# Patient Record
Sex: FEMALE | Race: WHITE | Employment: FULL TIME | URBAN - METROPOLITAN AREA
[De-identification: names, ages, dates, MRNs, and addresses within clinical notes are randomized per-mention and may not be internally consistent; named-entity substitution may affect disease eponyms.]

---

## 2017-11-28 ENCOUNTER — HOSPITAL ENCOUNTER (INPATIENT)
Facility: CLINIC | Age: 52
LOS: 3 days | Discharge: HOME | DRG: 897 | End: 2017-12-01
Attending: PSYCHIATRY & NEUROLOGY | Admitting: PSYCHIATRY & NEUROLOGY
Payer: COMMERCIAL

## 2017-11-28 DIAGNOSIS — F15.20 METHAMPHETAMINE USE DISORDER, SEVERE (H): ICD-10-CM

## 2017-11-28 DIAGNOSIS — F19.950 SUBSTANCE-INDUCED PSYCHOTIC DISORDER WITH DELUSIONS (H): ICD-10-CM

## 2017-11-28 DIAGNOSIS — I10 BENIGN ESSENTIAL HYPERTENSION: Primary | ICD-10-CM

## 2017-11-28 DIAGNOSIS — E55.9 VITAMIN D DEFICIENCY: ICD-10-CM

## 2017-11-28 PROBLEM — F29 PSYCHOSIS (H): Status: ACTIVE | Noted: 2017-11-28

## 2017-11-28 LAB
ALCOHOL BREATH TEST: 0 (ref 0–0.01)
AMPHETAMINES UR QL SCN: POSITIVE
BARBITURATES UR QL: NEGATIVE
BENZODIAZ UR QL: NEGATIVE
CANNABINOIDS UR QL SCN: NEGATIVE
COCAINE UR QL: NEGATIVE
ETHANOL UR QL SCN: NEGATIVE
OPIATES UR QL SCN: NEGATIVE

## 2017-11-28 PROCEDURE — 82075 ASSAY OF BREATH ETHANOL: CPT | Performed by: PSYCHIATRY & NEUROLOGY

## 2017-11-28 PROCEDURE — 12400007 ZZH R&B MH INTERMEDIATE UMMC

## 2017-11-28 PROCEDURE — 25000132 ZZH RX MED GY IP 250 OP 250 PS 637: Performed by: STUDENT IN AN ORGANIZED HEALTH CARE EDUCATION/TRAINING PROGRAM

## 2017-11-28 PROCEDURE — 80320 DRUG SCREEN QUANTALCOHOLS: CPT | Performed by: PSYCHIATRY & NEUROLOGY

## 2017-11-28 PROCEDURE — 99285 EMERGENCY DEPT VISIT HI MDM: CPT | Mod: Z6 | Performed by: PSYCHIATRY & NEUROLOGY

## 2017-11-28 PROCEDURE — 90791 PSYCH DIAGNOSTIC EVALUATION: CPT

## 2017-11-28 PROCEDURE — 99285 EMERGENCY DEPT VISIT HI MDM: CPT | Mod: 25 | Performed by: PSYCHIATRY & NEUROLOGY

## 2017-11-28 PROCEDURE — 25000132 ZZH RX MED GY IP 250 OP 250 PS 637: Performed by: PSYCHIATRY & NEUROLOGY

## 2017-11-28 PROCEDURE — 80307 DRUG TEST PRSMV CHEM ANLYZR: CPT | Performed by: PSYCHIATRY & NEUROLOGY

## 2017-11-28 RX ORDER — LISINOPRIL 10 MG/1
10 TABLET ORAL DAILY
Status: DISCONTINUED | OUTPATIENT
Start: 2017-11-28 | End: 2017-11-28

## 2017-11-28 RX ORDER — EPINEPHRINE 0.3 MG/.3ML
0.3 INJECTION SUBCUTANEOUS
Status: DISCONTINUED | OUTPATIENT
Start: 2017-11-28 | End: 2017-12-01 | Stop reason: HOSPADM

## 2017-11-28 RX ORDER — HYDROXYZINE HYDROCHLORIDE 25 MG/1
25-50 TABLET, FILM COATED ORAL EVERY 4 HOURS PRN
Status: DISCONTINUED | OUTPATIENT
Start: 2017-11-28 | End: 2017-12-01 | Stop reason: HOSPADM

## 2017-11-28 RX ORDER — MULTIVITAMIN WITH FOLIC ACID 400 MCG
TABLET ORAL
Status: ON HOLD | COMMUNITY
End: 2017-11-28

## 2017-11-28 RX ORDER — ACETAMINOPHEN 325 MG/1
650 TABLET ORAL EVERY 4 HOURS PRN
Status: DISCONTINUED | OUTPATIENT
Start: 2017-11-28 | End: 2017-12-01 | Stop reason: HOSPADM

## 2017-11-28 RX ORDER — OLANZAPINE 10 MG/1
10 TABLET ORAL
Status: DISCONTINUED | OUTPATIENT
Start: 2017-11-28 | End: 2017-12-01 | Stop reason: HOSPADM

## 2017-11-28 RX ORDER — ALUMINA, MAGNESIA, AND SIMETHICONE 2400; 2400; 240 MG/30ML; MG/30ML; MG/30ML
30 SUSPENSION ORAL EVERY 4 HOURS PRN
Status: DISCONTINUED | OUTPATIENT
Start: 2017-11-28 | End: 2017-12-01 | Stop reason: HOSPADM

## 2017-11-28 RX ORDER — MULTIPLE VITAMINS W/ MINERALS TAB 9MG-400MCG
1 TAB ORAL DAILY
COMMUNITY

## 2017-11-28 RX ORDER — LISINOPRIL 10 MG/1
10 TABLET ORAL DAILY
Status: ON HOLD | COMMUNITY
Start: 2017-08-02 | End: 2017-11-30

## 2017-11-28 RX ORDER — EPINEPHRINE 0.3 MG/.3ML
0.3 INJECTION SUBCUTANEOUS PRN
COMMUNITY
Start: 2017-08-02

## 2017-11-28 RX ORDER — OLANZAPINE 10 MG/2ML
10 INJECTION, POWDER, FOR SOLUTION INTRAMUSCULAR
Status: DISCONTINUED | OUTPATIENT
Start: 2017-11-28 | End: 2017-12-01 | Stop reason: HOSPADM

## 2017-11-28 RX ORDER — OLANZAPINE 10 MG/1
10 TABLET, ORALLY DISINTEGRATING ORAL ONCE
Status: COMPLETED | OUTPATIENT
Start: 2017-11-28 | End: 2017-11-28

## 2017-11-28 RX ORDER — NICOTINE 21 MG/24HR
1 PATCH, TRANSDERMAL 24 HOURS TRANSDERMAL DAILY
Status: DISCONTINUED | OUTPATIENT
Start: 2017-11-28 | End: 2017-12-01 | Stop reason: HOSPADM

## 2017-11-28 RX ORDER — NAPROXEN 250 MG/1
500 TABLET ORAL DAILY PRN
COMMUNITY
Start: 2017-09-27

## 2017-11-28 RX ORDER — MULTIPLE VITAMINS W/ MINERALS TAB 9MG-400MCG
1 TAB ORAL DAILY
Status: DISCONTINUED | OUTPATIENT
Start: 2017-11-29 | End: 2017-12-01 | Stop reason: HOSPADM

## 2017-11-28 RX ORDER — ASPIRIN 81 MG/1
81 TABLET ORAL DAILY
Status: ON HOLD | COMMUNITY
Start: 2017-08-02 | End: 2017-11-30

## 2017-11-28 RX ORDER — ASPIRIN 81 MG/1
81 TABLET ORAL DAILY
Status: DISCONTINUED | OUTPATIENT
Start: 2017-11-29 | End: 2017-12-01 | Stop reason: HOSPADM

## 2017-11-28 RX ORDER — NAPROXEN 500 MG/1
500 TABLET ORAL DAILY PRN
Status: DISCONTINUED | OUTPATIENT
Start: 2017-11-28 | End: 2017-12-01 | Stop reason: HOSPADM

## 2017-11-28 RX ADMIN — OLANZAPINE 10 MG: 10 TABLET, ORALLY DISINTEGRATING ORAL at 15:05

## 2017-11-28 RX ADMIN — Medication 1 MG: at 21:48

## 2017-11-28 RX ADMIN — OLANZAPINE 10 MG: 10 TABLET, FILM COATED ORAL at 21:48

## 2017-11-28 ASSESSMENT — ENCOUNTER SYMPTOMS
CARDIOVASCULAR NEGATIVE: 1
CONSTITUTIONAL NEGATIVE: 1
GASTROINTESTINAL NEGATIVE: 1
HALLUCINATIONS: 1
MUSCULOSKELETAL NEGATIVE: 1
HYPERACTIVE: 1
RESPIRATORY NEGATIVE: 1
SLEEP DISTURBANCE: 1
DECREASED CONCENTRATION: 1
HEMATOLOGIC/LYMPHATIC NEGATIVE: 1
NEUROLOGICAL NEGATIVE: 1
ENDOCRINE NEGATIVE: 1
EYES NEGATIVE: 1
NERVOUS/ANXIOUS: 1

## 2017-11-28 ASSESSMENT — ACTIVITIES OF DAILY LIVING (ADL)
ORAL_HYGIENE: PROMPTS
LAUNDRY: WITH SUPERVISION
GROOMING: PROMPTS
DRESS: INDEPENDENT
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
## 2017-11-28 NOTE — IP AVS SNAPSHOT
MRN:[id number]                      After Visit Summary   11/28/2017    Toshia Palmer    MRN: [id number]           Thank you!     Thank you for choosing [facility] for your care. Our goal is always to provide you with excellent care.        Patient Information     Date Of Birth          1965        Designated Caregiver       Most Recent Value    Caregiver    Will someone help with your care after discharge? no      About your hospital stay     You were admitted on:  November 28, 2017 You last received care in the:  [facility]    You were discharged on:  December 1, 2017       Who to Call     For medical emergencies, please call 911.  For non-urgent questions about your medical care, please call your primary care provider or clinic, None          Attending Provider     Provider Specialty    Steve Shannon MD Psychiatry    Wichser, Tessa Thomas MD Psychiatry       Primary Care Provider Fax #    Physician No Ref-Primary [phone]      Further instructions from your care team        Behavioral Discharge Planning and Instructions      Summary:  You were admitted on 11/28/2017  due to Psychotic Symptomology.  You were treated by Dr. Gladys MD & Dr. Pito MD and discharged on 12/1/2017 [facility]    Principal Diagnosis: Substance induced psychotic disorder  Secondary psychiatric diagnoses:   R/O delusional disorder  Methamphetamine use disorder  PTSD  Tobacco use disorder  History of anxiety and depression        Health Care Follow-up Appointments:       Tuesday December 5, 2017 - 5:00pm    Therapist: Zoraida Sellers PsyD., LP.  Address: [address] [address] ph: [phone] Fax: [phone]    Psychiatry provider - to be rescheduled with above clinic     Attend all scheduled appointments with your outpatient providers. Call at least 24 hours in advance if you need to reschedule an appointment to ensure continued access to your  "outpatient providers.   Major Treatments, Procedures and Findings:  You were provided with: a psychiatric assessment, assessed for medical stability, medication evaluation and/or management, group therapy and milieu management    Symptoms to Report: feeling more aggressive, increased confusion, losing more sleep, mood getting worse or thoughts of suicide    Early warning signs can include: increased depression or anxiety sleep disturbances increased thoughts or behaviors of suicide or self-harm  increased unusual thinking, such as paranoia or hearing voices    Safety and Wellness:  Take all medicines as directed.  Make no changes unless your doctor suggests them.      Follow treatment recommendations.  Refrain from alcohol and non-prescribed drugs.     Resources:   Crisis Intervention: 270.270.8169 or 069-486-3571 (TTY: 801.417.4475).  Call anytime for help.  [employer] [place] [employer]ness (www.[place].org): 866.885.9029 or 694-039-6690.  [facility] Crisis (COPE) Response - Adult [phone]    The treatment team has appreciated the opportunity to work with you.     If you have any questions or concerns our unit number is [phone]      Pending Results     Date and Time Order Name Status Description    11/30/2017 0845 EKG 12-lead, complete Preliminary     11/29/2017 1441 Blood metal panel In process             Admission Information     Date & Time Provider Department Dept. Phone    11/28/2017 Tessa Graham MD [facility] [phone]      Your Vitals Were     Blood Pressure Pulse Temperature Respirations Height Weight    102/68 114 97.8  F (36.6  C) 14 1.575 m (5' 2\") 58.1 kg (128 lb)    Pulse Oximetry BMI (Body Mass Index)                98% 23.41 kg/m2          MyChart Information     [facility] lets you send messages to your doctor, view your test results, renew your prescriptions, schedule appointments and more. To sign up, go to [email or web address] . Click on \"Log in\" on the left side " "of the screen, which will take you to the Welcome page. Then click on \"Sign up Now\" on the right side of the page.     You will be asked to enter the access code listed below, as well as some personal information. Please follow the directions to create your username and password.     Your access code is: [email or web address]  Expires: 3/1/2018 12:06 PM     Your access code will  in 90 days. If you need help or a new code, please call your [facility] clinic or [phone].        Care EveryWhere ID     This is your Care EveryWhere ID. This could be used by other organizations to access your [facility] medical records  [id number]        Equal Access to Services     GUS RAMIREZ : Guadalupe Greenfield, shaji jasmine, tyson beckett, joel benitez . So [facility] [phone].    ATENCIÓN: Si habla español, tiene a diaz disposición servicios gratuitos de asistencia lingüística. Llame al [phone].    We comply with applicable federal civil rights laws and [place] laws. We do not discriminate on the basis of race, color, national origin, age, disability, sex, sexual orientation, or gender identity.               Review of your medicines      START taking        Dose / Directions    DRISDOL 44973 UNITS Caps   Used for:  Vitamin D deficiency        Dose:  45598 Units   Start taking on:  2017   Take 50,000 Units by mouth every 7 days   Quantity:  7 capsule   Refills:  0       * risperiDONE 2 MG tablet   Commonly known as:  risperDAL   Used for:  Substance-induced psychotic disorder with delusions (H)        Dose:  2 mg   Take 1 tablet (2 mg) by mouth At Bedtime   Quantity:  30 tablet   Refills:  0       * risperiDONE 1 MG tablet   Commonly known as:  risperDAL   Used for:  Substance-induced psychotic disorder with delusions (H)        Dose:  1 mg   Take 1 tablet (1 mg) by mouth daily In the morning.   Quantity:  30 tablet   Refills:  0       * Notice:  This list has 2 " medication(s) that are the same as other medications prescribed for you. Read the directions carefully, and ask your doctor or other care provider to review them with you.      CONTINUE these medicines which have NOT CHANGED        Dose / Directions    aspirin 81 MG EC tablet   Used for:  Benign essential hypertension        Dose:  81 mg   Take 1 tablet (81 mg) by mouth daily   Quantity:  30 tablet   Refills:  0       EPINEPHrine 0.3 MG/0.3ML injection 2-pack   Commonly known as:  EPIPEN/ADRENACLICK/or ANY BX GENERIC EQUIV        Dose:  0.3 mg   Inject 0.3 mg into the muscle as needed for anaphylaxis (bee sting)   Refills:  0       hypromellose 0.3 % Soln ophthalmic solution   Commonly known as:  GENTEAL        Dose:  1 drop   Place 1 drop into both eyes every 4 hours as needed for dry eyes   Refills:  0       multivitamin, therapeutic with minerals Tabs tablet        Dose:  1 tablet   Take 1 tablet by mouth daily   Refills:  0       naproxen 250 MG tablet   Commonly known as:  NAPROSYN        Dose:  500 mg   Take 500 mg by mouth daily as needed   Refills:  0         STOP taking     lisinopril 10 MG tablet   Commonly known as:  PRINIVIL/ZESTRIL                Where to get your medicines      These medications were sent to [facility] [place], [place] - [address]  [address] [address]     Phone:  [phone]     aspirin 81 MG EC tablet    DRISDOL 56838 UNITS Caps    risperiDONE 1 MG tablet    risperiDONE 2 MG tablet                Protect others around you: Learn how to safely use, store and throw away your medicines at www.disposemymeds.org.             Medication List: This is a list of all your medications and when to take them. Check marks below indicate your daily home schedule. Keep this list as a reference.      Medications           Morning Afternoon Evening Bedtime As Needed    aspirin 81 MG EC tablet   Take 1 tablet (81 mg) by mouth daily   Last time this was  given:  81 mg on 12/1/2017  8:35 AM                                DRISDOL 36201 UNITS Caps   Take 50,000 Units by mouth every 7 days   Start taking on:  12/6/2017   Last time this was given:  50,000 Units on 11/29/2017  3:30 PM                                EPINEPHrine 0.3 MG/0.3ML injection 2-pack   Commonly known as:  EPIPEN/ADRENACLICK/or ANY BX GENERIC EQUIV   Inject 0.3 mg into the muscle as needed for anaphylaxis (bee sting)                                hypromellose 0.3 % Soln ophthalmic solution   Commonly known as:  GENTEAL   Place 1 drop into both eyes every 4 hours as needed for dry eyes                                multivitamin, therapeutic with minerals Tabs tablet   Take 1 tablet by mouth daily   Last time this was given:  1 tablet on 12/1/2017  8:35 AM                                naproxen 250 MG tablet   Commonly known as:  NAPROSYN   Take 500 mg by mouth daily as needed                                * risperiDONE 2 MG tablet   Commonly known as:  risperDAL   Take 1 tablet (2 mg) by mouth At Bedtime   Last time this was given:  1 mg on 12/1/2017  8:35 AM                                * risperiDONE 1 MG tablet   Commonly known as:  risperDAL   Take 1 tablet (1 mg) by mouth daily In the morning.   Last time this was given:  1 mg on 12/1/2017  8:35 AM                                * Notice:  This list has 2 medication(s) that are the same as other medications prescribed for you. Read the directions carefully, and ask your doctor or other care provider to review them with you.

## 2017-11-28 NOTE — IP AVS SNAPSHOT
[place] [place] [address]    [place] [address]    Phone:  [phone]                                       After Visit Summary   11/28/2017    Toshia Palmer    MRN: [id number]           After Visit Summary Signature Page     I have received my discharge instructions, and my questions have been answered. I have discussed any challenges I see with this plan with the nurse or doctor.    ..........................................................................................................................................  Patient/Patient Representative Signature      ..........................................................................................................................................  Patient Representative Print Name and Relationship to Patient    ..................................................               ................................................  Date                                            Time    ..........................................................................................................................................  Reviewed by Signature/Title    ...................................................              ..............................................  Date                                                            Time

## 2017-11-28 NOTE — ED NOTES
Bed: HW02  Expected date:   Expected time:   Means of arrival:   Comments:  [place] [address], 52 female, hallucinations, calm, 20 min

## 2017-11-29 LAB
ALBUMIN SERPL-MCNC: 3.5 G/DL (ref 3.4–5)
ALP SERPL-CCNC: 96 U/L (ref 40–150)
ALT SERPL W P-5'-P-CCNC: 18 U/L (ref 0–50)
ANION GAP SERPL CALCULATED.3IONS-SCNC: 8 MMOL/L (ref 3–14)
AST SERPL W P-5'-P-CCNC: 12 U/L (ref 0–45)
BASOPHILS # BLD AUTO: 0 10E9/L (ref 0–0.2)
BASOPHILS NFR BLD AUTO: 0.2 %
BILIRUB SERPL-MCNC: 0.3 MG/DL (ref 0.2–1.3)
BUN SERPL-MCNC: 20 MG/DL (ref 7–30)
CALCIUM SERPL-MCNC: 9.7 MG/DL (ref 8.5–10.1)
CHLORIDE SERPL-SCNC: 110 MMOL/L (ref 94–109)
CHOLEST SERPL-MCNC: 148 MG/DL
CO2 SERPL-SCNC: 26 MMOL/L (ref 20–32)
CREAT SERPL-MCNC: 0.76 MG/DL (ref 0.52–1.04)
DEPRECATED CALCIDIOL+CALCIFEROL SERPL-MC: 21 UG/L (ref 20–75)
DIFFERENTIAL METHOD BLD: NORMAL
EOSINOPHIL # BLD AUTO: 0.3 10E9/L (ref 0–0.7)
EOSINOPHIL NFR BLD AUTO: 3.8 %
ERYTHROCYTE [DISTWIDTH] IN BLOOD BY AUTOMATED COUNT: 13.5 % (ref 10–15)
ERYTHROCYTE [SEDIMENTATION RATE] IN BLOOD BY WESTERGREN METHOD: 15 MM/H (ref 0–30)
FOLATE SERPL-MCNC: 17.5 NG/ML
GFR SERPL CREATININE-BSD FRML MDRD: 80 ML/MIN/1.7M2
GLUCOSE SERPL-MCNC: 104 MG/DL (ref 70–99)
HCT VFR BLD AUTO: 42.7 % (ref 35–47)
HDLC SERPL-MCNC: 54 MG/DL
HGB BLD-MCNC: 14.4 G/DL (ref 11.7–15.7)
IMM GRANULOCYTES # BLD: 0 10E9/L (ref 0–0.4)
IMM GRANULOCYTES NFR BLD: 0.1 %
LDLC SERPL CALC-MCNC: 49 MG/DL
LYMPHOCYTES # BLD AUTO: 2.4 10E9/L (ref 0.8–5.3)
LYMPHOCYTES NFR BLD AUTO: 28.4 %
MCH RBC QN AUTO: 30.9 PG (ref 26.5–33)
MCHC RBC AUTO-ENTMCNC: 33.7 G/DL (ref 31.5–36.5)
MCV RBC AUTO: 92 FL (ref 78–100)
MONOCYTES # BLD AUTO: 0.7 10E9/L (ref 0–1.3)
MONOCYTES NFR BLD AUTO: 8.2 %
NEUTROPHILS # BLD AUTO: 4.9 10E9/L (ref 1.6–8.3)
NEUTROPHILS NFR BLD AUTO: 59.3 %
NONHDLC SERPL-MCNC: 94 MG/DL
NRBC # BLD AUTO: 0 10*3/UL
NRBC BLD AUTO-RTO: 0 /100
PLATELET # BLD AUTO: 174 10E9/L (ref 150–450)
POTASSIUM SERPL-SCNC: 4 MMOL/L (ref 3.4–5.3)
PROT SERPL-MCNC: 7.6 G/DL (ref 6.8–8.8)
RBC # BLD AUTO: 4.66 10E12/L (ref 3.8–5.2)
RPR SER-TITR: NEGATIVE {TITER}
SODIUM SERPL-SCNC: 144 MMOL/L (ref 133–144)
TRIGL SERPL-MCNC: 227 MG/DL
TSH SERPL DL<=0.005 MIU/L-ACNC: 2.02 MU/L (ref 0.4–4)
VIT B12 SERPL-MCNC: 781 PG/ML (ref 193–986)
WBC # BLD AUTO: 8.3 10E9/L (ref 4–11)

## 2017-11-29 PROCEDURE — 90853 GROUP PSYCHOTHERAPY: CPT

## 2017-11-29 PROCEDURE — 25000132 ZZH RX MED GY IP 250 OP 250 PS 637: Performed by: STUDENT IN AN ORGANIZED HEALTH CARE EDUCATION/TRAINING PROGRAM

## 2017-11-29 PROCEDURE — 12400007 ZZH R&B MH INTERMEDIATE UMMC

## 2017-11-29 PROCEDURE — 87389 HIV-1 AG W/HIV-1&-2 AB AG IA: CPT | Performed by: STUDENT IN AN ORGANIZED HEALTH CARE EDUCATION/TRAINING PROGRAM

## 2017-11-29 PROCEDURE — 85652 RBC SED RATE AUTOMATED: CPT | Performed by: STUDENT IN AN ORGANIZED HEALTH CARE EDUCATION/TRAINING PROGRAM

## 2017-11-29 PROCEDURE — 80061 LIPID PANEL: CPT | Performed by: STUDENT IN AN ORGANIZED HEALTH CARE EDUCATION/TRAINING PROGRAM

## 2017-11-29 PROCEDURE — 83825 ASSAY OF MERCURY: CPT | Performed by: STUDENT IN AN ORGANIZED HEALTH CARE EDUCATION/TRAINING PROGRAM

## 2017-11-29 PROCEDURE — 93005 ELECTROCARDIOGRAM TRACING: CPT

## 2017-11-29 PROCEDURE — 83655 ASSAY OF LEAD: CPT | Performed by: STUDENT IN AN ORGANIZED HEALTH CARE EDUCATION/TRAINING PROGRAM

## 2017-11-29 PROCEDURE — 84443 ASSAY THYROID STIM HORMONE: CPT | Performed by: PSYCHIATRY & NEUROLOGY

## 2017-11-29 PROCEDURE — 82607 VITAMIN B-12: CPT | Performed by: PSYCHIATRY & NEUROLOGY

## 2017-11-29 PROCEDURE — 80053 COMPREHEN METABOLIC PANEL: CPT | Performed by: PSYCHIATRY & NEUROLOGY

## 2017-11-29 PROCEDURE — 86618 LYME DISEASE ANTIBODY: CPT | Performed by: STUDENT IN AN ORGANIZED HEALTH CARE EDUCATION/TRAINING PROGRAM

## 2017-11-29 PROCEDURE — 86593 SYPHILIS TEST NON-TREP QUANT: CPT | Performed by: PSYCHIATRY & NEUROLOGY

## 2017-11-29 PROCEDURE — 86038 ANTINUCLEAR ANTIBODIES: CPT | Performed by: STUDENT IN AN ORGANIZED HEALTH CARE EDUCATION/TRAINING PROGRAM

## 2017-11-29 PROCEDURE — 82390 ASSAY OF CERULOPLASMIN: CPT | Performed by: STUDENT IN AN ORGANIZED HEALTH CARE EDUCATION/TRAINING PROGRAM

## 2017-11-29 PROCEDURE — 86255 FLUORESCENT ANTIBODY SCREEN: CPT | Performed by: STUDENT IN AN ORGANIZED HEALTH CARE EDUCATION/TRAINING PROGRAM

## 2017-11-29 PROCEDURE — 82306 VITAMIN D 25 HYDROXY: CPT | Performed by: PSYCHIATRY & NEUROLOGY

## 2017-11-29 PROCEDURE — 99222 1ST HOSP IP/OBS MODERATE 55: CPT | Mod: GC | Performed by: PSYCHIATRY & NEUROLOGY

## 2017-11-29 PROCEDURE — 85025 COMPLETE CBC W/AUTO DIFF WBC: CPT | Performed by: PSYCHIATRY & NEUROLOGY

## 2017-11-29 PROCEDURE — 82746 ASSAY OF FOLIC ACID SERUM: CPT | Performed by: STUDENT IN AN ORGANIZED HEALTH CARE EDUCATION/TRAINING PROGRAM

## 2017-11-29 PROCEDURE — 36415 COLL VENOUS BLD VENIPUNCTURE: CPT | Performed by: STUDENT IN AN ORGANIZED HEALTH CARE EDUCATION/TRAINING PROGRAM

## 2017-11-29 PROCEDURE — 36415 COLL VENOUS BLD VENIPUNCTURE: CPT | Performed by: PSYCHIATRY & NEUROLOGY

## 2017-11-29 PROCEDURE — 82175 ASSAY OF ARSENIC: CPT | Performed by: STUDENT IN AN ORGANIZED HEALTH CARE EDUCATION/TRAINING PROGRAM

## 2017-11-29 RX ORDER — RISPERIDONE 1 MG/1
1 TABLET ORAL 2 TIMES DAILY
Status: DISCONTINUED | OUTPATIENT
Start: 2017-11-29 | End: 2017-11-30

## 2017-11-29 RX ORDER — ERGOCALCIFEROL 1.25 MG/1
50000 CAPSULE, LIQUID FILLED ORAL
Status: DISCONTINUED | OUTPATIENT
Start: 2017-11-29 | End: 2017-12-01 | Stop reason: HOSPADM

## 2017-11-29 RX ORDER — LORAZEPAM 0.5 MG/1
0.5 TABLET ORAL ONCE
Status: COMPLETED | OUTPATIENT
Start: 2017-11-29 | End: 2017-11-29

## 2017-11-29 RX ADMIN — RISPERIDONE 1 MG: 1 TABLET ORAL at 21:13

## 2017-11-29 RX ADMIN — RISPERIDONE 1 MG: 1 TABLET ORAL at 15:30

## 2017-11-29 RX ADMIN — ASPIRIN 81 MG: 81 TABLET, COATED ORAL at 08:43

## 2017-11-29 RX ADMIN — MULTIPLE VITAMINS W/ MINERALS TAB 1 TABLET: TAB at 08:43

## 2017-11-29 RX ADMIN — OLANZAPINE 10 MG: 10 TABLET, FILM COATED ORAL at 21:11

## 2017-11-29 RX ADMIN — LORAZEPAM 0.5 MG: 0.5 TABLET ORAL at 17:04

## 2017-11-29 RX ADMIN — NICOTINE 1 PATCH: 14 PATCH, EXTENDED RELEASE TRANSDERMAL at 08:43

## 2017-11-29 RX ADMIN — Medication 1 MG: at 21:11

## 2017-11-29 RX ADMIN — ERGOCALCIFEROL 50000 UNITS: 1.25 CAPSULE ORAL at 15:30

## 2017-11-29 ASSESSMENT — ACTIVITIES OF DAILY LIVING (ADL)
HYGIENE/GROOMING: INDEPENDENT
DRESS: INDEPENDENT
LAUNDRY: WITH SUPERVISION
ORAL_HYGIENE: INDEPENDENT
GROOMING: INDEPENDENT
DRESS: INDEPENDENT
ORAL_HYGIENE: INDEPENDENT
LAUNDRY: WITH SUPERVISION

## 2017-11-29 NOTE — PROGRESS NOTES
"Admission:  Patient came to the unit as a voluntary admission. Reason for admission: paranoia, delusions, hallucinations, meth abuse, noncompliance with prescribed medications.   During the admission interview patient was falling asleep and had a difficult time answering questions with details.   She admitted that she has felt paranoid and it was the reason for her admission. She also said that she hears voices occasionally, but was unable to describe them. Patient said that she has not been taking medications. She admits to using meth, but did not say anything about the rout of use. Last psych hospitalization at [facility], 15 months ago, for \"the same reason\". Patient denied pain and all other physical issues.Patient was not interested in learning tonight, she was unable to hold her attention/falling asleep. Patient said that she lives alone in apartment and would like to return there after discharge. She is not interested in CD Tx at this time. 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 She has 4 grown children, age 35, 34, 30, and 27; denied problems with them.  No behavioral problems. She ate and went to bed.     Addendum: Patient requested to have a flu vaccine. Declined pneumonia vaccine.   "

## 2017-11-29 NOTE — PROGRESS NOTES
"Initial Psychosocial Assessment    I have reviewed the chart, met with the patient, and developed Care Plan.  Information for assessment was obtained from:     Patient: team interviwe and Chart: review of admission and past encounter    Presenting Problem:  patient is a 52 year old female with history of methamphetamine use disorder, substance-induced psychosis, delusional disorder, PTSD, anxiety, and depression who presented with methamphetamine induced psychosis      History of Mental Health and Chemical Dependency:  Per H&P  Prior diagnoses: Delusional disorder, substance induced psychotic disorder, depression, anxiety, PTSD, and methamphetamine use disorder     Hospitalizations: One prior hospitalization in June, 2015 at [facility] for delusional disorder and possible substance induced psychotic disorder from meth.     Suicide attempts: Denies  Self-injurious behavior: Denies  Violence: Denies  ECT/TMS: Denies  Past medications: Unsure. Records indicate patient has been on Prozac and Risperidone. She denies taking Celexa currently, although she is prescribed 20 mg daily.  Alcohol: No current use  Nicotine: Smokes 1-2 packs a day  Cannabis: Past trials. No current use.  Stimulants: History of meth use. Started using again 6 months ago. Unclear how much she uses. Utox + on admission. Agrees that she used recently.  Opiates: Denies  Benzodiazepines: Denies  Hallucinogens: Denies  Other: Denies  Chemical Dependency Treatment: Stated that she has been before, but then denied right afterwards. Unclear.      Family Description (Constellation, Family Psychiatric History):   Grew up in [place], [place]. 6 Siblings. Parents  when young. Mother was \"[place]ol[place]o.\"  Estranged from 6 siblings. Mom and dad are alive. She says they are \"critical.\" 4 children of adult age- 35,33,28,27. Tense relationship with children. Single since 5 years ago.      Significant Life Events (Illness, Abuse, Trauma, Death):  Illness " "    Living Situation:  Lives in apartment in [place]. Planning on moving in with cousin.      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 [facility] [address] [address] [place] [address] ph: [phone] Fax: [phone]     Social Service Assessment/Plan:  patient present with psychosis and paranoia with recent methamphetamine use. Has at least on past mental health hospitalization with similar encounter. Team to work with patient on medication management and recommendations. patient does have a therapist at above and was to see a psychiatric medication provider the day of admission.   "

## 2017-11-29 NOTE — PROGRESS NOTES
11/28/17 1849   Patient Belongings   Did you bring any home meds/supplements to the hospital?  Yes   Disposition of meds  Sent to security/pharmacy per site process   Patient Belongings wallet;money (see comment);clothing;cell phone/electronics;purse;ring;shoes;earings;jewelry;other (see comments)   Disposition of Belongings Locker   Belongings Search Yes   Clothing Search Yes   Second Staff Lyudmila ESCALERA Pt locker:   Lighters, 2 empty cigarette boxes, adhesive tape, 3 cell phones, purple frame glasses, black phone case, phone , 2 hair brushes, 3 hair clips, earrings, red fossil wallet, mail, tire shelli+ tools, key chains, purse, clothing, shoes    Security:   Social Security Card, [employer] bank (0020), Visa (3737), [employer] bank (9923), [employer] Money card, $3 cash, $30.17 in coins, Checkbook ([phone])    Medication:   Naproxen, Cyclobenzaprine, Allergy tablets, Loperamide     A               Admission:  I am responsible for any personal items that are not sent to the safe or pharmacy.  [facility] is not responsible for loss, theft or damage of any property in my possession.    Signature:  _________________________________ Date: _______  Time: _____                                              Staff Signature:  ____________________________ Date: ________  Time: _____      2nd Staff person, if patient is unable/unwilling to sign:    Signature: ________________________________ Date: ________  Time: _____     Discharge:  [facility] has returned all of my personal belongings:    Signature: _________________________________ Date: ________  Time: _____                                          Staff Signature:  ____________________________ Date: ________  Time: _____

## 2017-11-29 NOTE — PROGRESS NOTES
Active particip in am groups. Slept after lunch. Socially pleasant and polite when engaged. Ate lunch. Well groomed. Blunted affect and depressed mood mostly.       11/29/17 1300   Behavioral Health   Hallucinations denies / not responding to hallucinations   Thinking distractable   Orientation person: oriented;place: oriented;date: oriented;time: oriented   Memory baseline memory   Insight admits / accepts   Judgement impaired   Eye Contact at examiner   Affect blunted, flat   Mood depressed   Physical Appearance/Attire attire appropriate to age and situation   Hygiene well groomed   Activity other (see comment)  (Active particip in am groups)   Speech clear;coherent   Psychomotor / Gait balanced;steady   Psycho Education   Type of Intervention structured groups   Response participates, initiates socially appropriate   Hours 1   Treatment Detail Warning signs   Activities of Daily Living   Hygiene/Grooming independent   Oral Hygiene independent   Dress independent   Laundry with supervision   Room Organization independent

## 2017-11-29 NOTE — PROGRESS NOTES
"Initially seen by OT on this date. Toshia participated in a group discussion this afternoon on \"words of wisdom\" and contributed her perspective to the group. Engaged during the discussion, polite and pleasant. Will be given a written self-assessment upon increased group attendance. More observation needed to complete initial evaluation at this time.     "

## 2017-11-29 NOTE — PHARMACY-ADMISSION MEDICATION HISTORY
Admission medication history for the November 28, 2017 admission is complete.     Interview sources:  Patient, Mik ([facility]; [phone]), Care Everywhere ([facility])    Reliability of source: moderate - patient knew she was prescribed a blood pressure medication and aspirin, but was unsure of doses - all meds verified with Mik     Medication compliance: poor - per pt report she stopped all medications over 6 weeks ago    Changes made to PTA medication list (reason)  Added: all medications listed below (6 total)  Deleted: celexa 20 mg daily, prinzide, tenormin, albuterol, omeprazole (these medications were from 2010, no record at pharmacy for over 1 year, pt confirmed these are not current medications)   Changed: none    Additional medication history information:   - pt reports stopping all medications over 6 weeks ago. Mik last dispensed these medications in September 2017 for 1 month supply.   - Influenza vaccine status: needed (per [facility] and pt)    Prior to Admission medications    Medication Sig Last Dose Taking? Auth Provider   aspirin EC 81 MG EC tablet Take 81 mg by mouth daily More than a month  Unknown, Entered By History   EPINEPHrine (EPIPEN/ADRENACLICK/OR ANY BX GENERIC EQUIV) 0.3 MG/0.3ML injection 2-pack Inject 0.3 mg into the muscle as needed for anaphylaxis (bee sting)  More than a month  Unknown, Entered By History   hypromellose (GENTEAL) 0.3 % SOLN ophthalmic solution Place 1 drop into both eyes every 4 hours as needed for dry eyes  More than a month  Unknown, Entered By History   lisinopril (PRINIVIL/ZESTRIL) 10 MG tablet Take 10 mg by mouth daily More than a month  Unknown, Entered By History   naproxen (NAPROSYN) 250 MG tablet Take 500 mg by mouth daily as needed  More than a month  Unknown, Entered By History   multivitamin, therapeutic with minerals (THERA-VIT-M) TABS tablet Take 1 tablet by mouth daily More than a month  Unknown, Entered By History       Time spent: 20  minutes    Medication history completed by:   Jessica Mckinney, PharmD

## 2017-11-29 NOTE — PROGRESS NOTES
----------------------------------------------------------------------------------------------------------  [facility], [place]   Psychiatric Progress Note  Hospital Day #1     Assessment    Presentation: This patient is a 52 year old female with history of methamphetamine use disorder, substance-induced psychosis, delusional disorder, PTSD, anxiety, and depression who presented with methamphetamine induced psychosis on 11/28/2017.      Diagnostic Impression: Patient is a 52 year old  female with historical psychiatric diagnoses of delusional disorder, substance induced psychotic disorder, methamphetamine use disorder, PTSD, depression, and anxiety. History is significant for previous hospitalization two years prior with similar paranoid symptoms in the setting of methamphetamine use. Potential biological contributions to mental health presentation include diagnosis of rheumatoid arthritis and family history of alcoholism and schizophrenia in the patient's paternal uncle. Historical symptoms include anxiety, panic attacks, depressed mood, and paranoia. Current mental status exam reveals middle-aged woman with quite disorganized and tangential thought process with thought content that includes paranoid delusions, auditory hallucinations, and possible visual hallucinations.This is consistent with a diagnosis of substance induced psychotic disorder, given patient's recent methamphetamine use, positive urine toxicology, and history of similar presentation two years prior when presenting to the hospital with paranoia while using meth. Additionally, psychologic factors contributing to current presentation include history of trauma, poor coping skills, and fractured family relationships. Psychosocial stressors include relational discord with parents, children, and siblings.    Hospital course: Toshia Palmer was admitted to station 22 as a voluntary patient. PTA medications were  resumed, except for Celexa 20 mg daily due to not taking this medication in over a month. Patient was psychotic upon admission, being paranoid and disorganized in her thought process. She was provided with as needed Zyprexa initially, with some improvement in her symptoms after one day on Zyprexa and being sober. First episode psychosis workup was ordered, including CMP, CBC, UA, Vit D, B12, Folate, TSH, JAKE, Lyme, HIV, RPR, ESR, Ceruloplasmin, Anti-NMDA, Heavy Metals, and MRI. Patient was started on Risperidone 1 mg BID with goal of initiating Invega FAULKNER due to potential benefit from history of  nonadherance, patient preference, and greater protection if patient does relapse once discharged.    Medical course: Patient was medically cleared for admission to station 22. PTA medications for her medical conditions were resumed, except for Lisinopril that will likely be resumed once patient's blood pressure is more stable.    Plan     Principal psychiatric diagnosis:   -Substance induced psychotic disorder     Secondary psychiatric diagnoses:   -R/O delusional disorder  -Methamphetamine use disorder  -PTSD  -Tobacco use disorder  -History of anxiety and depression     Psychotropic Medications:    Modify:    -Start Risperidone 1 mg BID  -Ativan 0.5 mg one time for MRI    Continue:    -Zyprexa 10 mg PO/IM prn severe agitation/psychosis   -Melatonin 1 mg qhs prn sleep  -Hydroxyzine 25-50 mg prn anxiety  -Nicoderm cq 14 mg      Laboratory/Imaging: UA, Lyme, HIV, Folate, JAKE, Anti-NMDA, RPR, ESR, Ceruloplasmin, Heavy Metals, and MRI pending  Consults: None    Patient will be treated in therapeutic milieu with appropriate individual and group therapies as described.    Medical diagnoses to be addressed this admission:      1. New onset unilateral headaches with visual changes, mostly change in visual acuity  -MRI pending w and w/o contrast  -Tylenol prn  -Recommend visit with optometrist after discharge    2. Vitamin d  deficiency  -50,000 IU every 7 days x 8 weeks    3. Hypertension  -ASA 81 mg   -Hold Lisinopril 10 mg daily for now due to BP being lower     4. Arthritis, more consistent with OA  -Aleve 500 mg daily prn pain      5. GERD  -Conservative management     6. Carpal tunnel syndrome  -Aleve 500 mg daily prn pain      7. Hypertriglyceridemia  -Conservative management     8. Allergy to Bees  -Epipen prn    Consults: None    Relevant psychosocial stressors: Relational discord with parents, children, and siblings; living situation; and financial constraints     Legal Status: Voluntary    Safety Assessment:     Checks: Status 15  Precautions: Withdrawal    Patient has not required locked seclusion or restraints in the past 24 hours to maintain safety, please refer to RN documentation for further details.    The risks, benefits, alternatives and side effects have been discussed and are understood by the patient and other caregivers.    Anticipated Disposition/Discharge Date: TBD, pending clinical stabilization, medication optimization, and appropriate discharge planning.    Patient seen and discussed with my attending physician, Dr. Tessa Melgar DO MA  PGY-1 Psychiatry Resident      Attestation:  I, Tessa Graham, have personally performed an examination of this patient and I have reviewed the resident's documentation.  I have edited the note to reflect all relevant changes.  I have discussed this patient with the house staff on 11/29/2017.  I agree with resident findings and plan in today's note and yesterdays resident H&P.  I have reviewed all vitals and laboratory findings.    Tessa Graham MD       Interim History:     The patient's care was discussed with the treatment team and chart notes were reviewed.    VSS  Reported Sleep: 6.5  Scheduled Medications: Adherent   PRN Medications: Zyprexa 10 mg and Melatonin 1 mg     Staff Report: Patient was admitted last night. Reported having paranoia and voices.  "Patient was tired and wanted to sleep.    Interview: Patient was interviewed in her room. She reports feeling calmer after taking the medication last night and sleeping. Discussed the details surrounding her admission. She explained how for the last five years she has been suspicious of certain people in her life. Recently, the patient has been quite concerned about one of her neighbors who lives in the apartment of her basement. 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 Her son was also recently released from care home. Ms. Palmer is somewhat scared of her son and said that he has treated her poorly lately. She thinks people have been looking into her apartment and watching her at times. The patient planned to move to her cousin's house because of this. Her ultimate goal was to move 5 hours away so that \"no one would know her there.\" The patient did use meth recently and was not using it to cope with the stress from people that she is suspicious of.     Ms. Palmer is willing to start neuroleptics and undergo laboratory testing under the first episode psychosis workup. Briefly discussed R/B/SE of antipsychotics, with patient agreeing to start Risperidone later in the day. Of relevance, the patient has had unilateral new headaches the past three months. These are the worst headaches the patient has had in her life. She has had some increased blurriness in her vision and possible visual loss at times. She notes numbness in her hands with a diagnosis of carpal tunnel syndrome. She generally feels weak and her weakness is symmetrical. Denies any change in gait, facial droop, new paresthesia, or deficits in ambulation or coordination. No additional physical or psychiatric concerns at this time.    Review of systems:     ROS was negative unless noted above.          " "Allergies:     Allergies   Allergen Reactions     Bees      Penicillins Hives     Sulfa Drugs Hives            Psychiatric Examination:   /68  Pulse 114  Temp 97.3  F (36.3  C)  Resp 16  Ht 1.575 m (5' 2\")  Wt 53.1 kg (117 lb)  SpO2 98%  BMI 21.4 kg/m2  Weight is 117 lbs 0 oz  Body mass index is 21.4 kg/(m^2).    Appearance: Alert, oriented, dressed in hospital scrubs, found walking in unit  Attitude: Less guarded  Eye Contact: Intense at times, less than yesterday  Mood: \"Worried\"  Affect: Heightened affect at times, full range, mood congruent  Speech: Regular rate and rhythm   Psychomotor Behavior: No tremor, akathisia, dystonia, or abnormal activity  Thought Process: Tangential, minimally disorganized. Logical.  Associations: No loose associations  Thought Content: Auditory hallucinations and paranoid delusions, improving. Denies SI, plan, or SIB.  Insight: Poor  Judgment: Poor  Oriented to: Person, place, and time  Attention Span and Concentration: Grossly normal  Recent and Remote Memory: Intact  Language: English with appropriate syntax and vocabulary  Fund of Knowledge: Low to average   Muscle Strength and Tone: Grossly normal  Gait and Station: Grossly normal         Physical Examination:     General: Awake and alert, NAD  HEENT: EOMI, no scleral icterus, no injection of conjunctivae, moist mucus membranes  Neck: Trachea midline  Extremities: No edema   Skin: No sign of infection    Neuro:  Cranial Nerves:     CN-II DARRYL, visual field intact     CN-III/IV/VI EOMI, no saccades     CN-V Facial sensation intact.      CN-VII Facial movement symmetrical. No facial droop     CN-VIII Hearing intact     CN- IX,X,XII Tongue, uvula, and pharynx midline, move appropriately     CN-XI Shoulder full ROM  Motor:  Strength symmetrical, 5/5 bilateral upper and lower extremities. No deficits in tone or bulk. Pronator drift absent. No tremor or abnormal       movement.  Reflexes (R/L): Biceps 2/2, Triceps 2/2, " Brachioradialis 2/2, Patellar 2/2, Achilles 2/2, Downward going plantar reflex  Sensory: Sensation intact to fine touch with only deficit in digits 1-3 on both hands in median nerve distribution.  Cerebellar: Finger-nose-finger normal, Heel-to-Shin normal, LILIA normal  Gait: Normal gait with no deficits in tandem, toes, and heels. Negative Romberg         Labs:     Recent Results (from the past 24 hour(s))   CBC with platelets differential    Collection Time: 11/29/17  8:34 AM   Result Value Ref Range    WBC 8.3 4.0 - 11.0 10e9/L    RBC Count 4.66 3.8 - 5.2 10e12/L    Hemoglobin 14.4 11.7 - 15.7 g/dL    Hematocrit 42.7 35.0 - 47.0 %    MCV 92 78 - 100 fl    MCH 30.9 26.5 - 33.0 pg    MCHC 33.7 31.5 - 36.5 g/dL    RDW 13.5 10.0 - 15.0 %    Platelet Count 174 150 - 450 10e9/L    Diff Method Automated Method     % Neutrophils 59.3 %    % Lymphocytes 28.4 %    % Monocytes 8.2 %    % Eosinophils 3.8 %    % Basophils 0.2 %    % Immature Granulocytes 0.1 %    Nucleated RBCs 0 0 /100    Absolute Neutrophil 4.9 1.6 - 8.3 10e9/L    Absolute Lymphocytes 2.4 0.8 - 5.3 10e9/L    Absolute Monocytes 0.7 0.0 - 1.3 10e9/L    Absolute Eosinophils 0.3 0.0 - 0.7 10e9/L    Absolute Basophils 0.0 0.0 - 0.2 10e9/L    Abs Immature Granulocytes 0.0 0 - 0.4 10e9/L    Absolute Nucleated RBC 0.0    Comprehensive metabolic panel    Collection Time: 11/29/17  8:34 AM   Result Value Ref Range    Sodium 144 133 - 144 mmol/L    Potassium 4.0 3.4 - 5.3 mmol/L    Chloride 110 (H) 94 - 109 mmol/L    Carbon Dioxide 26 20 - 32 mmol/L    Anion Gap 8 3 - 14 mmol/L    Glucose 104 (H) 70 - 99 mg/dL    Urea Nitrogen 20 7 - 30 mg/dL    Creatinine 0.76 0.52 - 1.04 mg/dL    GFR Estimate 80 >60 mL/min/1.7m2    GFR Estimate If Black >90 >60 mL/min/1.7m2    Calcium 9.7 8.5 - 10.1 mg/dL    Bilirubin Total 0.3 0.2 - 1.3 mg/dL    Albumin 3.5 3.4 - 5.0 g/dL    Protein Total 7.6 6.8 - 8.8 g/dL    Alkaline Phosphatase 96 40 - 150 U/L    ALT 18 0 - 50 U/L    AST 12 0 - 45  U/L   TSH with free T4 reflex and/or T3 as indicated    Collection Time: 11/29/17  8:34 AM   Result Value Ref Range    TSH 2.02 0.40 - 4.00 mU/L   Vitamin B12    Collection Time: 11/29/17  8:34 AM   Result Value Ref Range    Vitamin B12 781 193 - 986 pg/mL   RPR with titer to monitor    Collection Time: 11/29/17  8:34 AM   Result Value Ref Range    RPR Titer Negative NEG^Negative [titer]   Vitamin D    Collection Time: 11/29/17  8:34 AM   Result Value Ref Range    Vitamin D Deficiency screening 21 20 - 75 ug/L

## 2017-11-29 NOTE — PLAN OF CARE
Problem: Psychotic Symptoms  Goal: Psychotic Symptoms  Signs and symptoms of listed problems will be absent or manageable.   Outcome: No Change  Patient admits to having paranoia and voices. She did not want to talk more about this, and was aslling asleep. Off meds, using meths. No behavioral issues.

## 2017-11-30 ENCOUNTER — APPOINTMENT (OUTPATIENT)
Dept: MRI IMAGING | Facility: CLINIC | Age: 52
DRG: 897 | End: 2017-11-30
Payer: COMMERCIAL

## 2017-11-30 LAB
ANA SER QL IF: NEGATIVE
B BURGDOR IGG+IGM SER QL: 0.06 (ref 0–0.89)
CERULOPLASMIN SERPL-MCNC: 44 MG/DL (ref 23–53)
HIV 1+2 AB+HIV1 P24 AG SERPL QL IA: NONREACTIVE

## 2017-11-30 PROCEDURE — 99232 SBSQ HOSP IP/OBS MODERATE 35: CPT | Mod: GC | Performed by: PSYCHIATRY & NEUROLOGY

## 2017-11-30 PROCEDURE — 25000132 ZZH RX MED GY IP 250 OP 250 PS 637: Performed by: STUDENT IN AN ORGANIZED HEALTH CARE EDUCATION/TRAINING PROGRAM

## 2017-11-30 PROCEDURE — 93005 ELECTROCARDIOGRAM TRACING: CPT

## 2017-11-30 PROCEDURE — 70551 MRI BRAIN STEM W/O DYE: CPT

## 2017-11-30 PROCEDURE — 12400007 ZZH R&B MH INTERMEDIATE UMMC

## 2017-11-30 RX ORDER — ERGOCALCIFEROL 1.25 MG/1
50000 CAPSULE, LIQUID FILLED ORAL
Qty: 7 CAPSULE | Refills: 0 | Status: SHIPPED | OUTPATIENT
Start: 2017-12-06

## 2017-11-30 RX ORDER — RISPERIDONE 1 MG/1
1 TABLET ORAL DAILY
Status: DISCONTINUED | OUTPATIENT
Start: 2017-12-01 | End: 2017-12-01 | Stop reason: HOSPADM

## 2017-11-30 RX ORDER — RISPERIDONE 2 MG/1
2 TABLET ORAL AT BEDTIME
Qty: 30 TABLET | Refills: 0 | Status: SHIPPED | OUTPATIENT
Start: 2017-11-30

## 2017-11-30 RX ORDER — RISPERIDONE 1 MG/1
1 TABLET ORAL DAILY
Qty: 30 TABLET | Refills: 0 | Status: SHIPPED | OUTPATIENT
Start: 2017-12-01

## 2017-11-30 RX ORDER — RISPERIDONE 2 MG/1
2 TABLET ORAL AT BEDTIME
Status: DISCONTINUED | OUTPATIENT
Start: 2017-11-30 | End: 2017-12-01 | Stop reason: HOSPADM

## 2017-11-30 RX ADMIN — RISPERIDONE 1 MG: 1 TABLET ORAL at 08:06

## 2017-11-30 RX ADMIN — HYDROXYZINE HYDROCHLORIDE 50 MG: 25 TABLET ORAL at 16:57

## 2017-11-30 RX ADMIN — RISPERIDONE 2 MG: 2 TABLET ORAL at 21:39

## 2017-11-30 RX ADMIN — ASPIRIN 81 MG: 81 TABLET, COATED ORAL at 08:06

## 2017-11-30 RX ADMIN — MULTIPLE VITAMINS W/ MINERALS TAB 1 TABLET: TAB at 08:06

## 2017-11-30 RX ADMIN — NICOTINE 1 PATCH: 14 PATCH, EXTENDED RELEASE TRANSDERMAL at 08:06

## 2017-11-30 ASSESSMENT — ACTIVITIES OF DAILY LIVING (ADL)
DRESS: INDEPENDENT
GROOMING: INDEPENDENT
GROOMING: INDEPENDENT
ORAL_HYGIENE: INDEPENDENT
LAUNDRY: WITH SUPERVISION
ORAL_HYGIENE: INDEPENDENT
LAUNDRY: WITH SUPERVISION
DRESS: INDEPENDENT

## 2017-11-30 NOTE — PROGRESS NOTES
Pt was visible in the milieu and social with select peers. Pt became very irritable and tense by peers in the milieu, and requested to be discharged. Pt signed a 12 hour intent, and was able to calm down. Pt slept in her room for the remainder of the shift.      11/30/17 1417   Behavioral Health   Hallucinations denies / not responding to hallucinations   Thinking distractable;poor concentration   Orientation person: oriented;place: oriented;date: oriented   Memory baseline memory   Insight insight appropriate to situation   Judgement impaired   Eye Contact at examiner   Affect blunted, flat;tense;irritable   Mood anxious;irritable   Physical Appearance/Attire attire appropriate to age and situation   Hygiene well groomed   Suicidality safety plan   Self Injury safety plan   Elopement Hallucinations directing behavior   Activity restless   Speech coherent;clear   Medication Sensitivity no stated side effects;no observed side effects   Psychomotor / Gait balanced;steady   Psycho Education   Type of Intervention 1:1 intervention   Response participates with encouragement   Hours 0.5   Treatment Detail check in    Activities of Daily Living   Hygiene/Grooming independent   Oral Hygiene independent   Dress independent   Laundry with supervision   Room Organization independent   Activity   Activity Assistance Provided independent

## 2017-11-30 NOTE — PLAN OF CARE
Problem: Psychotic Symptoms  Goal: Psychotic Symptoms  Signs and symptoms of listed problems will be absent or manageable.   Outcome: Improving  Pt denied all mh sx this pm,she was visible and interactive,guarded,shows little sx of meth w/d this pm,w/d and 15 min checks continue,she showed some sx anxiety snd depression

## 2017-11-30 NOTE — PLAN OF CARE
Problem: Patient Care Overview  Goal: Individualization & Mutuality  Illness Management Recovery model:  Warning Signs.    Patient identified the following early warning signs which may indicate that a relapse of their illness is starting:    Pt participated in group discussion of imr topic1.23.

## 2017-12-01 VITALS
RESPIRATION RATE: 14 BRPM | WEIGHT: 128 LBS | DIASTOLIC BLOOD PRESSURE: 68 MMHG | OXYGEN SATURATION: 98 % | SYSTOLIC BLOOD PRESSURE: 102 MMHG | BODY MASS INDEX: 23.55 KG/M2 | TEMPERATURE: 97.8 F | HEIGHT: 62 IN | HEART RATE: 114 BPM

## 2017-12-01 LAB
INTERPRETATION ECG - MUSE: NORMAL
INTERPRETATION ECG - MUSE: NORMAL
NMDAR IGG TITR SER IF: NORMAL {TITER}

## 2017-12-01 PROCEDURE — 99238 HOSP IP/OBS DSCHRG MGMT 30/<: CPT | Mod: GC | Performed by: PSYCHIATRY & NEUROLOGY

## 2017-12-01 PROCEDURE — 25000132 ZZH RX MED GY IP 250 OP 250 PS 637: Performed by: STUDENT IN AN ORGANIZED HEALTH CARE EDUCATION/TRAINING PROGRAM

## 2017-12-01 RX ADMIN — NICOTINE 1 PATCH: 14 PATCH, EXTENDED RELEASE TRANSDERMAL at 08:34

## 2017-12-01 RX ADMIN — RISPERIDONE 1 MG: 1 TABLET ORAL at 08:35

## 2017-12-01 RX ADMIN — MULTIPLE VITAMINS W/ MINERALS TAB 1 TABLET: TAB at 08:35

## 2017-12-01 RX ADMIN — ASPIRIN 81 MG: 81 TABLET, COATED ORAL at 08:35

## 2017-12-01 NOTE — PROGRESS NOTES
Toshia appears to be at her baseline. She is fully alert and oriented. She denied any discomfort and had insight into her current mental health. An order was written to discharge Toshia to her home. Adult suicidal risk assessment was done, and she denies any suicidal thought or plan. She was able to list her support network and coping skill. No psychotic symptoms observed at this time. This writer spent about 15 minutes with patient on discharge medication and discharge instruction. Patient will be going home via cab with [employer] cab voucher.

## 2017-12-01 NOTE — PROGRESS NOTES
11/30/17 2139   Behavioral Health   Hallucinations denies / not responding to hallucinations   Thinking distractable;poor concentration   Orientation person: oriented;place: oriented;date: oriented;time: oriented   Memory baseline memory   Insight poor   Judgement impaired   Eye Contact at examiner   Affect irritable   Mood anxious;irritable   Physical Appearance/Attire attire appropriate to age and situation   Hygiene well groomed   Suicidality (none stated)   1. Wish to be Dead No   2. Non-Specific Active Suicidal Thoughts  No   3. Active Sucidal Ideation with any Methods (Not Plan) Without Intent to Act  No   4. Active Suicidal Ideation with Some Intent to Act, Without Specific Plan  No   5. Active Suicidal Ideation with Specific Plan and Intent  No   Change in Protective Factors? No   Enviromental Risk Factors None   Activity restless   Speech clear;coherent   Medication Sensitivity no stated side effects   Psychomotor / Gait balanced   Psycho Education   Type of Intervention 1:1 intervention   Response participates with encouragement   Hours 0.5   Activities of Daily Living   Hygiene/Grooming independent   Oral Hygiene independent   Dress independent   Laundry with supervision   Room Organization independent   pt visible in milieu but withdrawn. Pt irritable and labile. Staff took pt in wheelchair to MRI early in shift which went well. Pt upset about dinner tray not being correct. Pt requesting to leave/demanding to leave. Pt upset stating that she came into the hospital with a set of keys but staff called psych associate that did admission and stated she did not come to the unit with a set of keys.

## 2017-12-01 NOTE — PROGRESS NOTES
Pt was discharge via cab to her Dads house. She verbalized understanding of follow-up discharge medication regiment. She denied any SI/SIB.Pt was sent home with all belongings and medication

## 2017-12-01 NOTE — DISCHARGE INSTRUCTIONS
Behavioral Discharge Planning and Instructions      Summary:  You were admitted on 11/28/2017  due to Psychotic Symptomology.  You were treated by Dr. Gladys MD & Dr. Pito MD and discharged on 12/1/2017 from [facility] to Home    Principal Diagnosis: Substance induced psychotic disorder  Secondary psychiatric diagnoses:   R/O delusional disorder  Methamphetamine use disorder  PTSD  Tobacco use disorder  History of anxiety and depression        Health Care Follow-up Appointments:       Tuesday December 5, 2017 - 5:00pm    Therapist: Zoraida Sellers PsyD., LP.  Address: [employer] [address] [place] [address] ph: [phone] Fax: [phone]    Psychiatry provider - to be rescheduled with above clinic     Attend all scheduled appointments with your outpatient providers. Call at least 24 hours in advance if you need to reschedule an appointment to ensure continued access to your outpatient providers.   Major Treatments, Procedures and Findings:  You were provided with: a psychiatric assessment, assessed for medical stability, medication evaluation and/or management, group therapy and milieu management    Symptoms to Report: feeling more aggressive, increased confusion, losing more sleep, mood getting worse or thoughts of suicide    Early warning signs can include: increased depression or anxiety sleep disturbances increased thoughts or behaviors of suicide or self-harm  increased unusual thinking, such as paranoia or hearing voices    Safety and Wellness:  Take all medicines as directed.  Make no changes unless your doctor suggests them.      Follow treatment recommendations.  Refrain from alcohol and non-prescribed drugs.     Resources:   Crisis Intervention: [phone] or [phone] (TTY: [phone]).  Call anytime for help.  [employer] [place] [employer] Mental Illness (www.mn.[place].org): 674.545.4189 or 568-839-1483.  [facility] Crisis (COPE) Response - Adult [phone]    The  treatment team has appreciated the opportunity to work with you.     If you have any questions or concerns our unit number is [phone]

## 2017-12-01 NOTE — PROGRESS NOTES
Brief Medicine Note, 12/1/17:    Internal Medicine contacted regarding EKG with peaked T waves in setting of normal electrolyte levels.  Toshia Palmer is a 52 year old female with past medical history significant for substance induced psychosis, methamphetamine abuse, delusional disorder, PTSD, anxiety, and depression admitted on 11/28/17 for methamphetamine induced psychosis.     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  [facility] with similar finding of old/indeterminate anterior infarct.  Discussed w/ on-call Cardiology fellow regarding EKG from 11/30, no acute findings.  VS stable.  No further recommendations at this time.       Please contact medicine if any acute symptoms develop or if hemodynamically unstable.           Promise Lazo CNP  Hospitalist Service   Pager: [phone]

## 2017-12-01 NOTE — PROGRESS NOTES
"Psychiatry Cross Cover Note    S: Notified by staff that patient signed a 12 hour intent to leave at 1pm. Dr. Melgar spoke with the patient who initially agreed to stay in the hospital. She then decided not to rescind her 12 hour intent to leave and was still interested in leaving. Dr. Melgar assessed her for safety and she was determined safe for discharge. Her discharge medications were ordered and discharge paperwork prepared. However, patient discovered that she does not have keys to her apartment and does not have transportation. She was not interested in a cab voucher given that she doesn't have anyone she can stay the night with. She requested to stay the night and discuss with her primary team tomorrow with regards to her discharge planning.     O: /68  Pulse 114  Temp 98.1  F (36.7  C)  Resp 16  Ht 1.575 m (5' 2\")  Wt 58.1 kg (128 lb)  SpO2 98%  BMI 23.41 kg/m2    A/P: patient may stay the night. Would defer to primary team tomorrow to assess for safe discharge planning.       López Mario MD  Psychiatry Resident PGY-2  "

## 2017-12-02 LAB
ARSENIC BLD-MCNC: <10 UG/L (ref 0–13)
LEAD BLDV-MCNC: <2 UG/DL (ref 0–4.9)
MERCURY BLD-MCNC: <3 UG/L (ref 0–10)

## 2017-12-02 NOTE — DISCHARGE SUMMARY
"    ----------------------------------------------------------------------------------------------------------  [facility], [place]   Discharge Summary  Hospital Day #3      Toshia Palmer MRN# [id number]   Age: 52 year old YOB: 1965     Date of Admission:  11/28/2017  Date of Discharge:                 12/1/2017  Admitting Physician:  Tessa Graham MD  Discharge Physician:  Christina Sheldon MD         Event Leading to Hospitalization:     \"Patient's description of events surrounding her admission are rather vague and disorganized. She reports being concerned about people following her, making noises, and being \"out to get her.\" She is unable to identify who these people are, but she is concerned that it may be one of the guys in the basement of her apartment complex. She is also concerned with her family, especially her son because he has been \"yelling at her.\" She said that she saw the \"face of the Devil\" at one point and then commented on how she mistook that possibly for her son yelling. Her family has not been happy with her recently. She also commented on how she has been hearing voices recently. She tries to tune the voices out and was unable to describe how many voices are present. She did say that they call her name. No mention of any command hallucinations. The patient is unclear when her symptoms began, but she does report feeling more \"run down\" the past few months. She initially denied current drug use but then said she took something this week, not being sure what it was. She previously made a comment that somewhat insert a drug through her nose. She thinks whatever she took may have been laced, but she is not even sure if this occurred. Physically she has no concerns currently and is willing to be hospitalized. Her goal for admission is to \"attend groups.\"       See Admission note by Dr. Graham found on 11/28/17 for additional details.         "  Diagnoses:     1. Substance induced psychotic disorder  2. Methamphetamine use disorder  3. PTSD  4. Tobacco use disorder  5. Hypertension  6. Headaches  7. Vitamin d deficiency  8. Arthritis, most consistent with OA  9. Carpal tunnel syndrome   10. Hypertriglyceridemia          Labs:     Results for orders placed or performed during the hospital encounter of 11/28/17   MR Brain w/o Contrast    Narrative    MR BRAIN WITHOUT CONTRAST 11/30/2017 4:33 PM    HISTORY: Psychosis.    COMPARISON: None.    TECHNIQUE: Routine noncontrast MR brain.    FINDINGS: There are a few tiny foci of T2 hyperintensity in the  central neto likely minimal small vessel ischemic change. No  intracranial hemorrhage, mass, or a recent infarct. Ventricles are  normal in size and position. Sinuses are clear.      Impression    IMPRESSION: Minimal nonspecific central pontine white matter  hyperintensity.    GARFIELD ABDI MD   Drug abuse screen 6 urine (tox)   Result Value Ref Range    Amphetamine Qual Urine Positive (A) NEG^Negative    Barbiturates Qual Urine Negative NEG^Negative    Benzodiazepine Qual Urine Negative NEG^Negative    Cannabinoids Qual Urine Negative NEG^Negative    Cocaine Qual Urine Negative NEG^Negative    Ethanol Qual Urine Negative NEG^Negative    Opiates Qualitative Urine Negative NEG^Negative   CBC with platelets differential   Result Value Ref Range    WBC 8.3 4.0 - 11.0 10e9/L    RBC Count 4.66 3.8 - 5.2 10e12/L    Hemoglobin 14.4 11.7 - 15.7 g/dL    Hematocrit 42.7 35.0 - 47.0 %    MCV 92 78 - 100 fl    MCH 30.9 26.5 - 33.0 pg    MCHC 33.7 31.5 - 36.5 g/dL    RDW 13.5 10.0 - 15.0 %    Platelet Count 174 150 - 450 10e9/L    Diff Method Automated Method     % Neutrophils 59.3 %    % Lymphocytes 28.4 %    % Monocytes 8.2 %    % Eosinophils 3.8 %    % Basophils 0.2 %    % Immature Granulocytes 0.1 %    Nucleated RBCs 0 0 /100    Absolute Neutrophil 4.9 1.6 - 8.3 10e9/L    Absolute Lymphocytes 2.4 0.8 - 5.3 10e9/L     Absolute Monocytes 0.7 0.0 - 1.3 10e9/L    Absolute Eosinophils 0.3 0.0 - 0.7 10e9/L    Absolute Basophils 0.0 0.0 - 0.2 10e9/L    Abs Immature Granulocytes 0.0 0 - 0.4 10e9/L    Absolute Nucleated RBC 0.0    Comprehensive metabolic panel   Result Value Ref Range    Sodium 144 133 - 144 mmol/L    Potassium 4.0 3.4 - 5.3 mmol/L    Chloride 110 (H) 94 - 109 mmol/L    Carbon Dioxide 26 20 - 32 mmol/L    Anion Gap 8 3 - 14 mmol/L    Glucose 104 (H) 70 - 99 mg/dL    Urea Nitrogen 20 7 - 30 mg/dL    Creatinine 0.76 0.52 - 1.04 mg/dL    GFR Estimate 80 >60 mL/min/1.7m2    GFR Estimate If Black >90 >60 mL/min/1.7m2    Calcium 9.7 8.5 - 10.1 mg/dL    Bilirubin Total 0.3 0.2 - 1.3 mg/dL    Albumin 3.5 3.4 - 5.0 g/dL    Protein Total 7.6 6.8 - 8.8 g/dL    Alkaline Phosphatase 96 40 - 150 U/L    ALT 18 0 - 50 U/L    AST 12 0 - 45 U/L   TSH with free T4 reflex and/or T3 as indicated   Result Value Ref Range    TSH 2.02 0.40 - 4.00 mU/L   Vitamin B12   Result Value Ref Range    Vitamin B12 781 193 - 986 pg/mL   RPR with titer to monitor   Result Value Ref Range    RPR Titer Negative NEG^Negative [titer]   Vitamin D   Result Value Ref Range    Vitamin D Deficiency screening 21 20 - 75 ug/L   HIV Antigen Antibody Combo   Result Value Ref Range    HIV Antigen Antibody Combo Nonreactive NR^Nonreactive       Ceruloplasmin   Result Value Ref Range    Ceruloplasmin 44 23 - 53 mg/dL   Erythrocyte sedimentation rate auto   Result Value Ref Range    Sed Rate 15 0 - 30 mm/h   Folate   Result Value Ref Range    Folate 17.5 >5.4 ng/mL   Lyme Disease Va with reflex to WB Serum   Result Value Ref Range    Lyme Disease Antibodies Serum 0.06 0.00 - 0.89   NMDA Receptor Ab IgG Serum with Reflex   Result Value Ref Range    NMDA Receptor Ab IgG Serum <1:10 <1:10   Lipid profile   Result Value Ref Range    Cholesterol 148 <200 mg/dL    Triglycerides 227 (H) <150 mg/dL    HDL Cholesterol 54 >49 mg/dL    LDL Cholesterol Calculated 49 <100 mg/dL    Non  HDL Cholesterol 94 <130 mg/dL   Anti Nuclear Va IgG by IFA with Reflex   Result Value Ref Range    JAKE interpretation Negative NEG^Negative   EKG 12-lead, complete   Result Value Ref Range    Interpretation ECG Click View Image link to view waveform and result    EKG 12-lead, complete   Result Value Ref Range    Interpretation ECG Click View Image link to view waveform and result    Internal Medicine Adult IP Consult for [facility] in [facility]: Patient to be seen: Routine within 24 hrs; Call back #: [phone]; Please review two recent EKGs. Patient has elevated T waves. No hyperkalemia. No other source identified, p...    Narrative    Promise Lazo Wendi Paddy, MIRIAM CNP     12/1/2017  9:20 AM  Please see Brief Medicine Note dated 12/1/17.     Alcohol breath test POCT   Result Value Ref Range    Alcohol Breath Test 0.000 0.00 - 0.01            Consults:     Medicine service for abnormal EKG (peaked T waves)         Hospital Course:     Psychiatric Course:    Toshia Palmer was admitted to station 22 as a voluntary patient. PTA medications were resumed, except for Celexa 20 mg daily due to not taking this medication in over a month. Patient was psychotic upon admission, being paranoid and disorganized in her thought process. Some improvement noted on hospital day 2 after one dose Zyprexa. First episode psychosis workup was ordered, including CMP, CBC, UA, Vit D, B12, Folate, TSH, JAKE, Lyme, HIV, RPR, ESR, Ceruloplasmin, Anti-NMDA, Heavy Metals, and MRI. All results were negative for any organic contributing condition. Patient started on Risperidone 1 mg BID with goal of initiating Consta FAULKNER on an outpatient basis due to potential benefit from history of nonadherance, patient preference, and greater protection if patient does relapse once discharged. On 11/30, she reported significant anxiety with regard to missing work and her dog. Given her history of hypertension and anxiety, propranolol was discussed  with the patient, but decided against. Risperidone was increased from 1 mg BID to 1 mg in the morning and 2 mg qhs. Patient's psychosis improved quickly with sobriety and antipsychotic intervention. By the day of discharge, patient reported feeling ready to go, demonstrating a logical and linear thought process with markedly less paranoia and anxiety. Patient denied any safety concerns and requested discharge.     As a result, Toshia Palmer was discharged to home with her father picking her up. At the time of discharge, Toshia Palmer was determined to not be a danger to herself or others.    Medical Course:    Patient was medically cleared for admission to station 22. PTA medications for her medical conditions were resumed, except for Lisinopril that was held due to blood pressure being stable. Recommended patient likely resume on an outpatient basis after meeting with her PCP. Patient found to have vitamin d deficiency being started on ergocalciferol 50,000 IU every 7 days x 8 weeks. Patient also complained of new unilateral headaches with some visual changes over three weeks. MRI was negative for any mass or intracranial abnormality beyond white matter hyperintensities. Recommended patient set up appointment with optometrist to address visual acuity changes. Patient found to have small growth lateral to her uvula that appeared a couple months ago. Patient will follow-up with her PCP regarding this issue. Patient did have evidence of possible old anterior infarct and intermittent peaked T waves on her EKG, warranting further consultation with medicine, which was determined to not require any additional follow up unless symptoms appear.    Risk Assessment:    Toshia Palmer has notable risk factors for self-harm, including age, single status, mental health condition of substance induced psychotic disorder and PTSD, substance use, and recent discharge from inpatient psychiatric hospitalization. However, risk is  "mitigated by commitment to sobriety, ability to volunteer a safety plan, and history of seeking help when needed. Additional steps taken to minimize risk include: discharging patient with close follow up with outpatient providers. Therefore, based on all available evidence including the factors cited above, Toshia Palmer does not appear to be at imminent risk for self-harm, and is appropriate for outpatient level of care.     However, if patient uses substances or is medication non-adherent, her risk of decompensation and SI/HI will be elevated. This was discussed with the patient.    This document serves as a transfer of care to Toshia Palmer's outpatient providers.         Discharge Medications:      Toshia Palmer   Home Medication Instructions RAMU:[id number]    Printed on:12/01/17 2137   Medication Information                      aspirin EC 81 MG EC tablet  Take 1 tablet (81 mg) by mouth daily             EPINEPHrine (EPIPEN/ADRENACLICK/OR ANY BX GENERIC EQUIV) 0.3 MG/0.3ML injection 2-pack  Inject 0.3 mg into the muscle as needed for anaphylaxis (bee sting)              Ergocalciferol (VITAMIN D) 71064 UNITS CAPS  Take 50,000 Units by mouth every 7 days             hypromellose (GENTEAL) 0.3 % SOLN ophthalmic solution  Place 1 drop into both eyes every 4 hours as needed for dry eyes              multivitamin, therapeutic with minerals (THERA-VIT-M) TABS tablet  Take 1 tablet by mouth daily             naproxen (NAPROSYN) 250 MG tablet  Take 500 mg by mouth daily as needed              risperiDONE (RISPERDAL) 1 MG tablet  Take 1 tablet (1 mg) by mouth daily In the morning.             risperiDONE (RISPERDAL) 2 MG tablet  Take 1 tablet (2 mg) by mouth At Bedtime                      Psychiatric Examination:     Appearance: Alert, oriented, dressed in hospital scrubs and sweatshirt   Attitude: Cooperative  Eye Contact: Good  Mood: \"Ready to get out of here\"  Affect: Heightened affect at times, full range, mood " congruent  Speech: Regular rate and rhythm   Psychomotor Behavior: No tremor, akathisia, dystonia, or abnormal activity  Thought Process: Logical and linear  Associations: No loose associations  Thought Content: Denies A/V hallucinations. Denies SI, plan, or SIB.  Insight: Limited  Judgment: Limited   Oriented to: Person, place, and time  Attention Span and Concentration: Grossly normal  Recent and Remote Memory: Intact  Language: English with appropriate syntax and vocabulary  Fund of Knowledge: Low to average   Muscle Strength and Tone: Grossly normal  Gait and Station: Grossly normal         Discharge Plan:     Patient is being discharged to home with the following appointment. Recommend considering transitioning patient to Risperidone Consta if able, as patient was open to this option and has had trouble with continuing medication in the past.      Health Care Follow-up Appointments:     2017 - 5:00pm    Therapist: Zoraida Sellers PsyD., LP.  Address: [place] [address] ph: [phone] Fax: [phone]. Psychiatry provider - to be rescheduled with above clinic     Pt seen and discussed with my attending, Dr. Pito Melgar DO MA  PGY-1 Psychiatry Resident  Pager: [phone]    Attestation:   The patient has been seen and evaluated by me,  Christina Sheldon. I have examined the patient today and reviewed the discharge plan with the resident and medical student. I agree with the final assessment and plan, as noted in the discharge summary. I have reviewed today's vital signs, medications, labs and imaging.  Total time discharge plannin minutes  Christina Sheldon MD